# Patient Record
(demographics unavailable — no encounter records)

---

## 2019-12-13 NOTE — RAD
XR Foot Rt 3 View STANDARD



INDICATION: Right foot pain after slipping. The patient reportedly stubbed the right foot.



COMPARISON: None.



FINDINGS:



Bones: No acute fracture identified.



Joints: Joints spaces appear preserved.



Lisfranc alignment: Lisfranc alignment appears within normal limits.



Soft tissues: No soft tissue injury demonstrated. No radiographic foreign body demonstrated.



IMPRESSION: No acute osseous abnormality.



Reported By: Fei Haro 

Electronically Signed:  12/13/2019 9:14 PM